# Patient Record
Sex: FEMALE | Race: AMERICAN INDIAN OR ALASKA NATIVE
[De-identification: names, ages, dates, MRNs, and addresses within clinical notes are randomized per-mention and may not be internally consistent; named-entity substitution may affect disease eponyms.]

---

## 2017-08-01 ENCOUNTER — HOSPITAL ENCOUNTER (OUTPATIENT)
Dept: HOSPITAL 5 - LABHHL | Age: 52
Discharge: HOME | End: 2017-08-01
Attending: SURGERY
Payer: COMMERCIAL

## 2017-08-01 DIAGNOSIS — C50.912: Primary | ICD-10-CM

## 2017-08-01 DIAGNOSIS — I10: ICD-10-CM

## 2017-08-01 PROCEDURE — 88305 TISSUE EXAM BY PATHOLOGIST: CPT

## 2017-08-10 ENCOUNTER — HOSPITAL ENCOUNTER (OUTPATIENT)
Dept: HOSPITAL 5 - SPVWC | Age: 52
Discharge: HOME | End: 2017-08-10
Attending: SURGERY
Payer: COMMERCIAL

## 2017-08-10 DIAGNOSIS — C50.212: Primary | ICD-10-CM

## 2017-08-10 PROCEDURE — 88305 TISSUE EXAM BY PATHOLOGIST: CPT

## 2017-08-10 PROCEDURE — 88342 IMHCHEM/IMCYTCHM 1ST ANTB: CPT

## 2017-08-10 PROCEDURE — 38525 BIOPSY/REMOVAL LYMPH NODES: CPT

## 2017-08-10 PROCEDURE — A4648 IMPLANTABLE TISSUE MARKER: HCPCS

## 2017-08-10 NOTE — ULTRASOUND REPORT
ULTRASOUND GUIDED NEEDLE CORE BIOPSY OF A LEFT AXILLARY LYMPH NODE WITH 

CLIP PLACEMENT :  08/10/17 10:06:00 



   

CLINICAL: Newly diagnosed left breast cancer.



COMPARISON :07/31/17 

   

FINDINGS: The procedure was explained to the patient and informed 

consent was obtained.

   

Ultrasound demonstrated a left axillary lymph node with cortical 

thickening a 4.7 mm..





The skin in the axilla was prepped with Betadine and anesthetized with 

1% lidocaine.  Ultrasound guided needle core biopsy of the  lymph node 

was performed through a small dermatotomy using 2% lidocaine with 

epinephrine for deep anesthesia and a 18-gauge Achieve biopsy device.  

Two samples were obtained and placed in formalin.  A clip was deployed 

within the lymph node.

   

Hemostasis was achieved with minimal pressure and a sterile dressing 

was applied.  The patient tolerated the procedure well and there were 

no apparent complications. She was discharged in good   

condition and was given instructions for wound care and followup.  

   

IMPRESSION: Uncomplicated ultrasound-guided needle core biopsy of a 

left lymph node with clip placement.

## 2017-08-28 ENCOUNTER — HOSPITAL ENCOUNTER (OUTPATIENT)
Dept: HOSPITAL 5 - SPVIMAG | Age: 52
Discharge: HOME | End: 2017-08-28
Attending: SURGERY
Payer: COMMERCIAL

## 2017-08-28 DIAGNOSIS — I10: ICD-10-CM

## 2017-08-28 DIAGNOSIS — C50.212: Primary | ICD-10-CM

## 2017-08-28 PROCEDURE — 0159T: CPT

## 2017-08-28 PROCEDURE — A9577 INJ MULTIHANCE: HCPCS

## 2017-08-28 PROCEDURE — 77059: CPT

## 2017-08-28 PROCEDURE — C8908 MRI W/O FOL W/CONT, BREAST,: HCPCS

## 2017-08-29 NOTE — MAGNETIC RESONANCE REPORT
BILATERAL BREAST MRI WITHOUT AND WITH CONTRAST: 08/28/17 08:58:00



CLINICAL: Newly diagnosed left breast cancer.  Status post left 

ultrasound-guided needle biopsy on 07/31/17 with pathologic diagnosis 

of invasive ductal carcinoma, Bon grade 2.  She also had an US 

guided left axillary lymph node biopsy performed by me on 08/10/17 with 

negative pathology.



COMPARISON:05/25/17 and 07/12/17 [facility] mammograms.



TECHNIQUE: Axial 1.0-mm T1 without,  axial high resolution 2.0-mm T2 

and axial 1.0-mm dynamic Vibrant high-resolution postcontrast T1 fat 

saturation sequences on a 1.5 Becca magnet.  The examination was 

performed with an 8 channel dedicated [place] breast coil. Post 

processing with CAD and subtraction was performed on an [employer] 

workstation.  20 cc of Multihance was injected without incident for the 

contrast portion of the exam. Consent was obtained prior to the 

administration of the contrast.  



FINDINGS:



Right: Mild background parenchymal enhancement.  No mass or suspicious 

enhancement.  No suspicious right axillary or right internal mammary 

lymph nodes.



Left: Mild background parenchymal enhancement.  The newly diagnosed 

left breast cancer is an irregular enhancing mass at 10 o'clock 9.5 cm 

from the nipple and 5.7 cm from the chest wall.  It measures 3.2 x 3.1 

x 2.0 cm and demonstrates heterogeneous enhancement with 170% peak 

enhancement, mixed kinetics and 19% type III washout.  No other mass or 

suspicious enhancement of the left breast.  The previously biopsied 

left axillary lymph node has benign morphology and is not enlarged.  

However, there is a larger more suspicious level I axillary lymph node 

measuring 2.4 x 1.6 cm.  The cortex is irregular and thickened (7 mm).  

Several smaller lymph nodes are less suspicious.





IMPRESSION: 1. 3.2 cm known left breast cancer and no additional 

suspicious lesion of the left breast.  2.  A 2.4 cm

level I axillary lymph is more suspicious than the previously biopsied 

left axillary lymph node.  3.  Negative right breast.





LEFT BI-RADS  6 -- Known Cancer





RIGHT BI-RADS 1 -- Negative

## 2017-08-31 ENCOUNTER — HOSPITAL ENCOUNTER (OUTPATIENT)
Dept: HOSPITAL 5 - SPVWC | Age: 52
Discharge: HOME | End: 2017-08-31
Attending: SURGERY
Payer: COMMERCIAL

## 2017-08-31 DIAGNOSIS — C50.212: Primary | ICD-10-CM

## 2017-08-31 DIAGNOSIS — R59.9: ICD-10-CM

## 2017-08-31 PROCEDURE — 38525 BIOPSY/REMOVAL LYMPH NODES: CPT

## 2017-08-31 PROCEDURE — 88305 TISSUE EXAM BY PATHOLOGIST: CPT

## 2017-08-31 PROCEDURE — A4648 IMPLANTABLE TISSUE MARKER: HCPCS

## 2017-08-31 NOTE — ULTRASOUND REPORT
ULTRASOUND GUIDED NEEDLE CORE BIOPSY OF A LEFT AXILLARY LYMPH NODE WITH 

CLIP PLACEMENT :  08/31/17 08:14:00 



   

CLINICAL: Newly diagnosed left breast cancer.  She had a negative 

ultrasound biopsy of a left axillary lymph node on 08/10/17 but the MRI 

demonstrated a more suspicious lymph node.



COMPARISON :08/28/17 MRI 

   

FINDINGS: The procedure was explained to the patient and informed 

consent was obtained.

   

Ultrasound demonstrated the 2 cm suspicious lymph node.





The skin in the axilla was prepped with Betadine and anesthetized with 

1% lidocaine.  Ultrasound guided needle core biopsy of the  lymph node 

was performed through a small dermatotomy using 2% lidocaine with 

epinephrine for deep anesthesia and a 18-gauge Achieve biopsy device.  

3 samples were obtained and placed in formalin.  A clip was deployed 

within the lymph node.

   

Hemostasis was achieved with minimal pressure and a sterile dressing 

was applied.  The patient tolerated the procedure well and there were 

no apparent complications. She was discharged in good condition and was 

given instructions for wound care and followup.  

   

IMPRESSION: Uncomplicated ultrasound-guided needle core biopsy of a 

left axillary lymph node with clip placement.

## 2017-09-27 ENCOUNTER — HOSPITAL ENCOUNTER (OUTPATIENT)
Dept: HOSPITAL 5 - OR | Age: 52
Discharge: HOME | End: 2017-09-27
Attending: SURGERY
Payer: COMMERCIAL

## 2017-09-27 VITALS — DIASTOLIC BLOOD PRESSURE: 75 MMHG | SYSTOLIC BLOOD PRESSURE: 127 MMHG

## 2017-09-27 DIAGNOSIS — Z80.3: ICD-10-CM

## 2017-09-27 DIAGNOSIS — C50.212: Primary | ICD-10-CM

## 2017-09-27 DIAGNOSIS — E66.9: ICD-10-CM

## 2017-09-27 LAB
BASOPHILS NFR BLD AUTO: 0.2 % (ref 0–1.8)
EOSINOPHIL NFR BLD AUTO: 1.1 % (ref 0–4.3)
HCT VFR BLD CALC: 32.6 % (ref 30.3–42.9)
HGB BLD-MCNC: 11.1 GM/DL (ref 10.1–14.3)
MCH RBC QN AUTO: 28 PG (ref 28–32)
MCHC RBC AUTO-ENTMCNC: 34 % (ref 30–34)
MCV RBC AUTO: 82 FL (ref 79–97)
PLATELET # BLD: 330 K/MM3 (ref 140–440)
RBC # BLD AUTO: 3.96 M/MM3 (ref 3.65–5.03)
WBC # BLD AUTO: 9 K/MM3 (ref 4.5–11)

## 2017-09-27 PROCEDURE — 78800 RP LOCLZJ TUM 1 AREA 1 D IMG: CPT

## 2017-09-27 PROCEDURE — 85025 COMPLETE CBC W/AUTO DIFF WBC: CPT

## 2017-09-27 PROCEDURE — 88341 IMHCHEM/IMCYTCHM EA ADD ANTB: CPT

## 2017-09-27 PROCEDURE — 88361 TUMOR IMMUNOHISTOCHEM/COMPUT: CPT

## 2017-09-27 PROCEDURE — 81025 URINE PREGNANCY TEST: CPT

## 2017-09-27 PROCEDURE — 88307 TISSUE EXAM BY PATHOLOGIST: CPT

## 2017-09-27 PROCEDURE — 19302 P-MASTECTOMY W/LN REMOVAL: CPT

## 2017-09-27 PROCEDURE — 88333 PATH CONSLTJ SURG CYTO XM 1: CPT

## 2017-09-27 PROCEDURE — 88342 IMHCHEM/IMCYTCHM 1ST ANTB: CPT

## 2017-09-27 PROCEDURE — 64450 NJX AA&/STRD OTHER PN/BRANCH: CPT

## 2017-09-27 PROCEDURE — 76098 X-RAY EXAM SURGICAL SPECIMEN: CPT

## 2017-09-27 PROCEDURE — 36415 COLL VENOUS BLD VENIPUNCTURE: CPT

## 2017-09-27 PROCEDURE — A9541 TC99M SULFUR COLLOID: HCPCS

## 2017-09-27 NOTE — MAMMOGRAPHY REPORT
Specimen radiograph.



Findings: A single specimen radiograph demonstrates multiple 

calcifications within the central aspect of the specimen. A single 

biopsy clip is present.

## 2017-09-27 NOTE — SHORT STAY SUMMARY
Short Stay Documentation


Date of service: 09/27/17





- History


H&P: obtained from office





- Allergies and Medications


Current Medications: 


 Allergies





No Known Allergies Allergy (Verified 06/28/14 22:57)


 





 Home Medications











 Medication  Instructions  Recorded  Confirmed  Last Taken  Type


 


Ferrous Sulfate [Feosol] 325 mg PO QDAY 09/13/17 09/27/17 09/27/17 History


 


RX: Triamter/Hctz 37.5-25 mg 1 tab PO DAILY 09/13/17 09/27/17 09/27/17 History





[Maxzide-25]     


 


HYDROcodone/APAP 5-325 [[place] 1 each PO Q6HR PRN #30 tablet 09/27/17  Unknown Rx





5/325]     








Active Medications





Cefazolin Sodium (Ancef/Sterile Water 2 Gm/20 Ml)  2 gm IV PREOP NR


   Stop: 09/27/17 23:59


Hydromorphone HCl (Dilaudid)  0.5 mg IV Q10MIN PRN


   PRN Reason: Pain , Severe (7-10)


   Stop: 09/27/17 23:59


Sodium Chloride (Nacl 0.9% 1000 Ml)  1,000 mls @ 75 mls/hr IV AS DIRECT VANESSA


   Last Admin: 09/27/17 11:20 Dose:  75 mls/hr


Midazolam HCl (Versed)  2 mg IV PREOP NR


   Stop: 09/27/17 23:59


   Last Admin: 09/27/17 11:58 Dose:  2 mg











- Brief post op/procedure progress note


Date of procedure: 09/27/17


Pre-op diagnosis: Left breast cancer upper inner quadrant


Post-op diagnosis: same


Procedure: 





Ultrasound guided left partial mastectomy with SLNB


Anesthesia: GETA


Findings: 





Known left breast cancer of the upper inner quadrant; radiograph specimen of 

partial mastecomy with clip present; 4 SLNs identified


Surgeon: RAYMUNDO VALIENTE


Assistant: TIERA ASHLEY


Estimated blood loss: minimal


Pathology: list (left partial mastectomy; 4SLNs)


Specimen disposition: to lab


Condition: stable





- Disposition


Condition at discharge: Good


Disposition: DC-01 TO HOME OR SELFCARE





Short Stay Discharge Plan


Activity: other (no heavy lifting)


Diet: regular


Wound: other (keep incisions clean and dry; may shower in 24 hours; no baths, 

pools or lakes)


Follow up with: 


LORY HUNTER MD [Primary Care Provider] - 7 Days


RAYMUNDO VALIENTE MD [Staff Physician] - 7 Days


Prescriptions: 


HYDROcodone/APAP 5-325 [[place] 5/325] 1 each PO Q6HR PRN #30 tablet


 PRN Reason: Pain

## 2017-09-27 NOTE — OPERATIVE REPORT
Operative Report


Operative Report: 





Date of Service: September 27, 2017





Preoperative diagnosis: Left breast cancer of the upper inner quadrant





Postoperative diagnosis: Same





Procedure: Ultrasound guided left partial mastectomy with SLNB





Surgeon: Glenys Daley MD





Assistant: Dr. Godfrey





Anesthesia: General





Findings: Radial breast specimen with mass and clip present, 4 sentinel lymph 

nodes





Complications: None





EBL: Minimal





Disposition: PACU in good condition





Indications for operative procedure: This is a 52-year-old premenopausal 

-American lady with newly diagnosed left breast cancer of the upper inner 

quadrant, stage II.  Recommendations were to proceed with a left partial 

mastectomy and sentinel lymph node biopsy.  Patient wished to proceed with 

above procedure.








Procedure in detail: The patient was taken to the operating room and was laid 

supine.  Anesthesia placed a pectoral muscle block prior to going back to the 

operating room.  Gen. anesthesia was administered.  The left breast and axilla 

were prepped and draped in the normal sterile operative fashion.  The nipple 

was injected with radioisotope.  Gamma probe was inserted into the axilla for 

identification of sentinel lymph node location. Skin incision was made in the 

axilla with a 15 blade knife and dissection taken down to the subcutaneous 

tissues.  Axillary fascia was opened with the Bovie cautery.  The gamma probe 

was inserted into the axilla and 4 sentinel lymph nodes were identified which 

were dissected free.  SLNS were sent for  x-ray with findings of one clip 

present.  Patient with prior lymph node biopsy with negative findings.  

Hemostasis was noted.  Axillary cavity was irrigated and suctioned.  Axillary 

fascia was approximated and closed using interrupted 3-0 Vicryl and skin 

brought together and closed using 4-0 Monocryl followed by skin affix.





Attention was then taken towards the left breast.  Known left breast cancer was 

at the upper inner quadrant at the 11 o'clock position 4-5 cm from the nipple.  

Ultrasound was used to any the area of incision of known breast cancer mass. A 

skin incision was then made with a 15 blade knife and dissection taken down to 

the subcutaneous tissues.  First began with raising of the superior flap taken 

down posteriorly to the pectoralis muscle, followed by raising of  the medial 

flap and taken down posterior to the pectoralis muscle, followed by raising of 

the inferior flap and  taken down to the pectoralis muscle, followed by raising 

of the lateral flap and taken down to the pectoralis muscle.  Partial 

mastectomy was removed from the pectoralis muscle without incident.  Specimen 

was appropriately marked and sent to radiology with findings of mass and clip 

present.  Specimen was sent to pathology.  Hemostasis was obtained.  The 

posterior tissue of the breast was mobilized for oncoplastic closure.  The deep 

breast tissues were appropriately approximated and closed using interrupted 3-0 

Vicryl and anterior tissues approximated and closed using interrupted 3-0 

Vicryl.  The skin brought together and closed using 4-0 Monocryl followed by 

skin affix.  She tolerated it very well and was awake from  anesthesia without 

any complications and transported to PACU in good condition.

## 2017-09-27 NOTE — MAMMOGRAPHY REPORT
Specimen radiograph.



Findings: 3 very small specimens are submitted. The radiographs of 

these 3 specimens demonstrates one biopsy clip within one of the 

specimens. No additional clips or calcifications are seen.

## 2017-09-27 NOTE — ANESTHESIA CONSULTATION
Anesthesia Consult and Med Hx


Date of service: 09/27/17





- Airway


Anesthetic Teeth Evaluation: Good, Chipped (top front), Partials


ROM Head & Neck: Adequate


Mental/Hyoid Distance: Adequate


Mallampati Class: Class II


Intubation Access Assessment: Probably Good





- Pulmonary Exam


CTA: Yes





- Cardiac Exam


Cardiac Exam: RRR





- Pre-Operative Health Status


ASA Pre-Surgery Classification: ASA2


Proposed Anesthetic Plan: General


Nerve Block: pec





- Pulmonary


Hx Smoking: No


Hx Sleep Apnea: No (SYED PRE SCREEN HIGH RISK)





- Cardiovascular System


Hx Hypertension: Yes





- Central Nervous System


Hx Seizures: No


CVA: No


Hx Back Pain: Yes





- Endocrine


Hx Renal Disease: No


Hx Cirrhosis: No


Hx Non-Insulin Dependent Diabetes: No





- Hematic


Hx Anemia: Yes





- Other Systems


Hx Obesity: Yes

## 2019-01-10 ENCOUNTER — HOSPITAL ENCOUNTER (INPATIENT)
Dept: HOSPITAL 5 - ED | Age: 54
LOS: 3 days | Discharge: HOME | DRG: 291 | End: 2019-01-13
Attending: INTERNAL MEDICINE | Admitting: INTERNAL MEDICINE
Payer: COMMERCIAL

## 2019-01-10 DIAGNOSIS — I11.0: Primary | ICD-10-CM

## 2019-01-10 DIAGNOSIS — E66.9: ICD-10-CM

## 2019-01-10 DIAGNOSIS — Z92.21: ICD-10-CM

## 2019-01-10 DIAGNOSIS — C50.912: ICD-10-CM

## 2019-01-10 DIAGNOSIS — Z82.49: ICD-10-CM

## 2019-01-10 DIAGNOSIS — I50.21: ICD-10-CM

## 2019-01-10 DIAGNOSIS — I42.0: ICD-10-CM

## 2019-01-10 DIAGNOSIS — J96.01: ICD-10-CM

## 2019-01-10 DIAGNOSIS — Z92.3: ICD-10-CM

## 2019-01-10 LAB
BASOPHILS # (AUTO): 0 K/MM3 (ref 0–0.1)
BASOPHILS NFR BLD AUTO: 0.5 % (ref 0–1.8)
BUN SERPL-MCNC: 18 MG/DL (ref 7–17)
BUN/CREAT SERPL: 20 %
CALCIUM SERPL-MCNC: 9.2 MG/DL (ref 8.4–10.2)
EOSINOPHIL # BLD AUTO: 0.2 K/MM3 (ref 0–0.4)
EOSINOPHIL NFR BLD AUTO: 2.1 % (ref 0–4.3)
HCT VFR BLD CALC: 36.9 % (ref 30.3–42.9)
HEMOLYSIS INDEX: 12
HGB BLD-MCNC: 12.3 GM/DL (ref 10.1–14.3)
LYMPHOCYTES # BLD AUTO: 2.2 K/MM3 (ref 1.2–5.4)
LYMPHOCYTES NFR BLD AUTO: 27.3 % (ref 13.4–35)
MCHC RBC AUTO-ENTMCNC: 33 % (ref 30–34)
MCV RBC AUTO: 88 FL (ref 79–97)
MONOCYTES # (AUTO): 0.6 K/MM3 (ref 0–0.8)
MONOCYTES % (AUTO): 7.6 % (ref 0–7.3)
PLATELET # BLD: 229 K/MM3 (ref 140–440)
RBC # BLD AUTO: 4.2 M/MM3 (ref 3.65–5.03)
T4 FREE SERPL-MCNC: 1.4 NG/DL (ref 0.76–1.46)

## 2019-01-10 PROCEDURE — 82728 ASSAY OF FERRITIN: CPT

## 2019-01-10 PROCEDURE — 93306 TTE W/DOPPLER COMPLETE: CPT

## 2019-01-10 PROCEDURE — 80048 BASIC METABOLIC PNL TOTAL CA: CPT

## 2019-01-10 PROCEDURE — 93005 ELECTROCARDIOGRAM TRACING: CPT

## 2019-01-10 PROCEDURE — 84439 ASSAY OF FREE THYROXINE: CPT

## 2019-01-10 PROCEDURE — 36415 COLL VENOUS BLD VENIPUNCTURE: CPT

## 2019-01-10 PROCEDURE — 71275 CT ANGIOGRAPHY CHEST: CPT

## 2019-01-10 PROCEDURE — 80053 COMPREHEN METABOLIC PANEL: CPT

## 2019-01-10 PROCEDURE — 84443 ASSAY THYROID STIM HORMONE: CPT

## 2019-01-10 PROCEDURE — 85025 COMPLETE CBC W/AUTO DIFF WBC: CPT

## 2019-01-10 PROCEDURE — 87806 HIV AG W/HIV1&2 ANTB W/OPTIC: CPT

## 2019-01-10 PROCEDURE — 85379 FIBRIN DEGRADATION QUANT: CPT

## 2019-01-10 PROCEDURE — 93017 CV STRESS TEST TRACING ONLY: CPT

## 2019-01-10 PROCEDURE — 83880 ASSAY OF NATRIURETIC PEPTIDE: CPT

## 2019-01-10 PROCEDURE — 78452 HT MUSCLE IMAGE SPECT MULT: CPT

## 2019-01-10 PROCEDURE — 84484 ASSAY OF TROPONIN QUANT: CPT

## 2019-01-10 PROCEDURE — A9502 TC99M TETROFOSMIN: HCPCS

## 2019-01-10 PROCEDURE — 93010 ELECTROCARDIOGRAM REPORT: CPT

## 2019-01-10 PROCEDURE — 94760 N-INVAS EAR/PLS OXIMETRY 1: CPT

## 2019-01-10 RX ADMIN — Medication SCH: at 22:36

## 2019-01-10 NOTE — CAT SCAN REPORT
CTA CHEST:



HISTORY: Dyspnea, tachycardia, cough.



COMPARISON: And.



TECHNIQUE: Helical CT in 1.25mm intervals following IV contrast.  

Pulmonary embolus protocol.  Sagittal and coronal reformatted images.  

Rotational MIP images.



FINDINGS:





Contrast bolus is satisfactory.  No pulmonary embolus is identified.



Thyroid gland: Normal.



Tracheobronchial tree: Normal.



Esophagus: Normal.



Heart: Mild cardiomegaly.



Pericardium: Trace pericardial fluid.



Mediastinum: Normal.



Lung Fields: Mild congestive changes are suspected in both lungs. No 

evidence for mass or infiltrate. No significant underlying lung disease 

is appreciated.



Pleural Spaces: Small layering pleural effusions are identified, right 

greater than left.



Musculoskeletal: Normal.





IMPRESSION: 

No evidence for pulmonary embolus.  

CHF.

## 2019-01-10 NOTE — HISTORY AND PHYSICAL REPORT
History of Present Illness


Chief complaint: 





I just cant breathe


History of present illness: 


52 YO Female with HTN, Obesity, BrCa presents to ED for evaluation. Pt states 

that she has experienced shortness of breath and nonproductive cough over the 

past 1 month with worsening symptoms over the past 1 week. Pt acknowledges 

Orthopnea/PND, Decreased Exercise tolerance, Shortness of Breath. Pt was seen 

and evaluated at Urgent Care and instructed to come to [facility] ED for further care 

and evaluation. Pt seen and evaluated in ED and found to have Acute CHF 

Decompensation complicated by Respiratory Failure. Pt initiated on CHF protocol,

and admitted to telemetry. Cardiology consulted in ED.  








Past History


Past Medical History: cancer, hypertension


Past Surgical History: Other (Lumpectomy, Breast Biopsy)


Social history: , lives with family


Family history: hypertension





Medications and Allergies


                                    Allergies











Allergy/AdvReac Type Severity Reaction Status Date / Time


 


No Known Allergies Allergy   Verified 10/31/17 12:20











                                Home Medications











 Medication  Instructions  Recorded  Confirmed  Last Taken  Type


 


Triamter/Hctz 37.5-25 mg 1 tab PO DAILY 09/13/17 01/10/19 11/01/17 History





[Maxzide-25]     


 


Anastrozole [Arimidex] 1 mg PO QPM 01/10/19 01/10/19 Unknown History











Active Meds: 


Active Medications





Acetaminophen (Tylenol)  650 mg PO Q4H PRN


   PRN Reason: Pain MILD(1-3)/Fever >100.5/HA


Acetaminophen/Hydrocodone Bitart ([place] 5/325)  1 each PO Q6HR PRN


   PRN Reason: Pain


Albuterol (Proventil)  2.5 mg IH Q4HRT PRN


   PRN Reason: Shortness Of Breath


Ferrous Sulfate (Feosol)  325 mg PO QDAY VANESSA


Furosemide (Lasix)  20 mg IV BID@0600,1800 VANESSA


Ondansetron HCl (Zofran)  4 mg IV Q8H PRN


   PRN Reason: Nausea And Vomiting


Sodium Chloride (Sodium Chloride Flush Syringe 10 Ml)  10 ml IV BID VANESSA


Sodium Chloride (Sodium Chloride Flush Syringe 10 Ml)  10 ml IV PRN PRN


   PRN Reason: LINE FLUSH











Review of Systems


Constitutional: no weight loss, no weight gain, no fever, no chills


Ears, nose, mouth and throat: no ear pain, no ear discharge, no tinnitis, no 

decreased hearing, no nose pain, no nasal congestion


Breasts: no change in shape, no swelling, no mass


Cardiovascular: orthopnea, dyspnea on exertion, paroxysmal nocturnal dyspnea, 

decreased exercise tolerance, no chest pain, no palpitations, no rapid/irregular

 heart beat, no edema


Respiratory: cough, no cough with sputum, no excessive sputum


Gastrointestinal: no abdominal pain, no nausea, no vomiting, no diarrhea


Genitourinary Female: no pelvic pain, no flank pain, no menorrhagia, no dysuria,

 no urinary frequency, no urgency


Rectal: no pain, no incontinence, no bleeding


Musculoskeletal: no neck stiffness, no neck pain, no shooting arm pain, no arm 

numbness/tingling, no low back pain, no shooting leg pain


Integumentary: no rash, no pruritis, no redness, no sores, no wounds


Neurological: no head injury, no paralysis, no weakness, no parathesias, no 

numbness


Psychiatric: no anxiety, no memory loss, no change in sleep habits, no sleep 

disturbances, no insomnia


Endocrine: no cold intolerance, no heat intolerance, no polyphagia, no excessive

 thirst


Hematologic/Lymphatic: no easy bruising, no easy bleeding, no lymphadenopathy, 

no lymphedema


Allergic/Immunologic: no urticaria, no allergic rhinitis, no wheezing, no 

persistent infections, no anaphylaxis, no angioedema





Exam





- Constitutional


Vitals: 


                                        











Temp Pulse Resp BP Pulse Ox


 


 98.1 F   103 H  22   137/99   98 


 


 01/10/19 10:09  01/10/19 10:09  01/10/19 10:09  01/10/19 10:09  01/10/19 10:09











General appearance: Present: mild distress, obese





- EENT


Eyes: Present: PERRL


ENT: hearing intact, clear oral mucosa





- Neck


Neck: Present: supple, normal ROM





- Respiratory


Respiratory effort: normal


Respiratory: bilateral: diminished, rhonchi





- Cardiovascular


Heart Sounds: Present: S1 & S2.  Absent: rub, click





- Extremities


Extremities: pulses symmetrical, No edema


Peripheral Pulses: within normal limits





- Abdominal


General gastrointestinal: Present: soft, non-tender, non-distended, normal bowel

 sounds


Female genitourinary: Present: normal





- Integumentary


Integumentary: Present: clear, warm, dry





- Musculoskeletal


Musculoskeletal: gait normal, strength equal bilaterally





- Psychiatric


Psychiatric: appropriate mood/affect, intact judgment & insight





- Neurologic


Neurologic: CNII-XII intact, moves all extremities





Results





- Labs


CBC & Chem 7: 


                                 01/10/19 11:22





                                 01/10/19 11:22


Labs: 


                              Abnormal lab results











  01/10/19 01/10/19 Range/Units





  11:22 11:22 


 


Mono % (Auto)  7.6 H   (0.0-7.3)  %


 


BUN   18 H  (7-17)  mg/dL


 


Glucose   121 H  ()  mg/dL


 


NT-Pro-B Natriuret Pep   8586 H  (0-900)  pg/mL














Assessment and Plan





- Patient Problems


(1) Acute CHF


Current Visit: Yes   Status: Acute   


Qualifiers: 


   Heart failure type: systolic   Qualified Code(s): I50.21 - Acute systolic 

(congestive) heart failure   


Plan to address problem: 


Admit to telemetry, Echo, cardiology consulted in ED, bnp, d dimer, afterload 

reduction, strict I/O, daily weight, monitor uop q shift, 








(2) Respiratory failure


Current Visit: Yes   Status: Acute   


Qualifiers: 


   Chronicity: acute   Respiratory failure complication: hypoxia   Qualified 

Code(s): J96.01 - Acute respiratory failure with hypoxia   


Plan to address problem: 


Supplemental oxygen, nebulizer therapy, Chest x ray, d dimer, pulse oximetry, 

CTA chest








(3) Breast cancer


Current Visit: No   Status: Acute   


Qualifiers: 


   Laterality: left 


Plan to address problem: 


supportive care, outpatient oncology F/U.








(4) DVT prophylaxis


Current Visit: Yes   Status: Acute   


Plan to address problem: 


SCD to BLE while in bed

## 2019-01-10 NOTE — EMERGENCY DEPARTMENT REPORT
ED Shortness of Breath HPI





- General


Chief Complaint: Dyspnea/Respdistress


Stated Complaint: SOB


Time Seen by Provider: 01/10/19 10:50


Source: patient


Mode of arrival: Ambulatory


Limitations: No Limitations





- History of Present Illness


Initial Comments: 





Mrs. Palacios is a very pleasant 53-year-old female has had 5 weeks of shortness 

of breath with nonproductive cough.  Dry cough.  She was seen at urgent care on 

Tuesday and was told that she had fluid on her lungs.  Subsequently she was 

evaluated by PMD who evaluated her and recommended ED evaluation.  She denies 

fever.  She denies chest pain.  Breast cancer is in remission.  Further medical 

history includes tension.  Peritonsillar symptoms.  No previous history of lung 

disease such as asthma.  However she did have a "attack" several years ago.  

She's noticed that the shortness of breath is worse at night.


MD Complaint: shortness of breath


-: week(s) (5)


Severity: mild, moderate


Consistency: intermittent


Context: recent URI


Associated Symptoms: cough





- Related Data


                                Home Medications











 Medication  Instructions  Recorded  Confirmed  Last Taken


 


Ferrous Sulfate [Feosol 325 MG tab] 325 mg PO QDAY 09/13/17 11/01/17 11/01/17


 


Triamter/Hctz 37.5-25 mg 1 tab PO DAILY 09/13/17 11/01/17 11/01/17





[Maxzide-25]    








                                  Previous Rx's











 Medication  Instructions  Recorded  Last Taken  Type


 


HYDROcodone/APAP 5-325 [[place] 1 each PO Q6HR PRN #30 tablet 09/27/17 Unknown Rx





5-325 mg TAB]    











                                    Allergies











Allergy/AdvReac Type Severity Reaction Status Date / Time


 


No Known Allergies Allergy   Verified 10/31/17 12:20














ED Review of Systems


ROS: 


Stated complaint: SOB


Other details as noted in HPI





Comment: All other systems reviewed and negative


Constitutional: denies: fever, malaise


Respiratory: cough, shortness of breath, wheezing


Cardiovascular: denies: chest pain





ED Past Medical Hx





- Past Medical History


Previous Medical History?: Yes


Hx Hypertension: Yes


Hx of Cancer: Yes (breast)





- Surgical History


Hx Breast Surgery: Yes (BREAST BX)


Additional Surgical History: lumpectomy





- Social History


Smoking Status: Never Smoker


Substance Use Type: None





- Medications


Home Medications: 


                                Home Medications











 Medication  Instructions  Recorded  Confirmed  Last Taken  Type


 


Ferrous Sulfate [Feosol 325 MG tab] 325 mg PO QDAY 09/13/17 11/01/17 11/01/17 

History


 


Triamter/Hctz 37.5-25 mg 1 tab PO DAILY 09/13/17 11/01/17 11/01/17 History





[Maxzide-25]     


 


HYDROcodone/APAP 5-325 [[place] 1 each PO Q6HR PRN #30 tablet 09/27/17 10/31/17 Un

known Rx





5-325 mg TAB]     














ED Physical Exam





- General


Limitations: No Limitations


General appearance: alert, in no apparent distress





- Head


Head exam: Present: atraumatic, normocephalic





- Eye


Eye exam: Present: normal appearance





- ENT


ENT exam: Present: mucous membranes moist





- Neck


Neck exam: Present: normal inspection.  Absent: tenderness, meningismus





- Respiratory


Respiratory exam: Present: normal lung sounds bilaterally.  Absent: respiratory 

distress, wheezes, rales, rhonchi





- Cardiovascular


Cardiovascular Exam: Present: regular rate, normal rhythm, normal heart sounds. 

Absent: systolic murmur, diastolic murmur, rubs, gallop





- GI/Abdominal


GI/Abdominal exam: Present: soft, normal bowel sounds.  Absent: distended, ten

derness, guarding, rebound





- Extremities Exam


Extremities exam: Present: normal inspection





- Back Exam


Back exam: Present: normal inspection





- Neurological Exam


Neurological exam: Present: alert, oriented X3, normal gait





- Psychiatric


Psychiatric exam: Present: normal affect, normal mood





- Skin


Skin exam: Present: warm, dry, intact, normal color.  Absent: rash





ED Course


                                   Vital Signs











  01/10/19





  10:09


 


Temperature 98.1 F


 


Pulse Rate 103 H


 


Respiratory 22





Rate 


 


Blood Pressure 137/99


 


O2 Sat by Pulse 98





Oximetry 














ED Medical Decision Making





- Lab Data


Result diagrams: 


                                 01/10/19 11:22





                                 01/10/19 11:22








                         Laboratory Results - last 24 hr











  01/10/19 01/10/19





  11:22 11:22


 


WBC  8.1 


 


RBC  4.20 


 


Hgb  12.3 


 


Hct  36.9 


 


MCV  88 


 


MCH  29 


 


MCHC  33 


 


RDW  13.5 


 


Plt Count  229 


 


Lymph % (Auto)  27.3 


 


Mono % (Auto)  7.6 H 


 


Eos % (Auto)  2.1 


 


Baso % (Auto)  0.5 


 


Lymph #  2.2 


 


Mono #  0.6 


 


Eos #  0.2 


 


Baso #  0.0 


 


Seg Neutrophils %  62.5 


 


Seg Neutrophils #  5.1 


 


Sodium   141


 


Potassium   3.6


 


Chloride   102.9


 


Carbon Dioxide   25


 


Anion Gap   17


 


BUN   18 H


 


Creatinine   0.9


 


Estimated GFR   > 60


 


BUN/Creatinine Ratio   20


 


Glucose   121 H


 


Calcium   9.2


 


Troponin T   < 0.010


 


NT-Pro-B Natriuret Pep   8586 H














- EKG Data





01/10/19 11:09


EKG obtained at 1021


Sinus tachycardia rate 100 beats a minute normal axis prolonged QT interval 

diffuse T-wave inversions nonspecific T wave pattern no ST elevation no signs of

pericarditis





- Radiology Data


Radiology results: report reviewed





CT chest angiogram findings of congestive heart failure with cardiomegaly, lung 

congestion, bilateral pleural effusions and pericardial fluid.





No pulmonary embolism





- Medical Decision Making





New-onset congestive heart failure with bilateral pleural effusions, significant

orthopnea and PND.  Hospitalist Dr. Luong agreed to admit to telemetry


Critical care attestation.: 


If time is entered above; I have spent that time in minutes in the direct care 

of this critically ill patient, excluding procedure time.








ED Disposition


Clinical Impression: 


 New onset of congestive heart failure, Acute CHF, Dyspnea, History of 

hypertension, History of breast cancer





Disposition: DC-09 OP ADMIT IP TO THIS HOSP


Is pt being admited?: Yes


Does the pt Need Aspirin: No


Condition: Stable

## 2019-01-11 RX ADMIN — Medication SCH ML: at 21:16

## 2019-01-11 RX ADMIN — Medication SCH ML: at 10:34

## 2019-01-11 RX ADMIN — FUROSEMIDE SCH MG: 10 INJECTION, SOLUTION INTRAVENOUS at 21:16

## 2019-01-11 RX ADMIN — FERROUS SULFATE TAB 325 MG (65 MG ELEMENTAL FE) SCH MG: 325 (65 FE) TAB at 10:34

## 2019-01-11 RX ADMIN — FUROSEMIDE SCH MG: 10 INJECTION, SOLUTION INTRAVENOUS at 06:25

## 2019-01-11 RX ADMIN — CARVEDILOL SCH MG: 6.25 TABLET, FILM COATED ORAL at 21:16

## 2019-01-11 NOTE — PROGRESS NOTE
Assessment and Plan


 Patient Problems


(1) Acute CHF


Current Visit: Yes   Status: Acute   


Qualifiers: 


   Heart failure type: systolic   Qualified Code(s): I50.21 - Acute systolic 

(congestive) heart failure   


Plan to address problem: 


IV lasix ECHO


Daily weights


I/O daily








(2) Respiratory failure


Current Visit: Yes   Status: Acute   


Qualifiers: 


   Chronicity: acute   Respiratory failure complication: hypoxia   Qualified 

Code(s): J96.01 - Acute respiratory failure with hypoxia   


Plan to address problem: 


Nebulizer treatments


IV Lasix and after load reducing agents








(3) Breast cancer


Current Visit: No   Status: Acute   


Qualifiers: 


   Laterality: left 


Plan to address problem: 


supportive care, outpatient oncology F/U.








(4) DVT prophylaxis


Current Visit: Yes   Status: Acute   


Plan to address problem: 


SCD to BLE while in bed











Subjective


Date of service: 01/11/19


Principal diagnosis: Acute CHF


Interval history: 


SOB better








Objective





- Constitutional


Vitals: 


                               Vital Signs - 12hr











  01/11/19 01/11/19





  07:40 10:00


 


Temperature 98.0 F 


 


Pulse Rate 86 


 


Pulse Rate [  86





Apical]  


 


Pulse Rate [  86





Left Radial]  


 


Pulse Rate [  86





Right Radial]  


 


Respiratory 20 18





Rate  


 


Blood Pressure 109/77 


 


O2 Sat by Pulse 99 98





Oximetry  











General appearance: Present: no acute distress, well-nourished





- EENT


Eyes: PERRL, EOM intact


ENT: hearing intact, clear oral mucosa


Ears: bilateral: normal





- Neck


Neck: supple, normal ROM





- Respiratory


Respiratory effort: normal


Respiratory: bilateral: CTA





- Breasts


Breasts: normal





- Cardiovascular


Rhythm: regular


Heart Sounds: Present: S1 & S2.  Absent: gallop, rub


Extremities: pulses intact, No edema, normal color, Full ROM





- Gastrointestinal


General gastrointestinal: Present: soft, non-tender, non-distended, normal bowel

sounds





- Genitourinary


Female genitourinary: normal





- Integumentary


Integumentary: clear, warm, dry





- Musculoskeletal


Musculoskeletal: 1, strength equal bilaterally





- Neurologic


Neurologic: moves all extremities





- Psychiatric


Psychiatric: memory intact, appropriate mood/affect, intact judgment & insight





- Labs


CBC & Chem 7: 


                                 01/10/19 11:22





                                 01/10/19 11:22 Measure In Units Or Cc's?: units

## 2019-01-11 NOTE — CONSULTATION
Addendum entered and electronically signed by BAMBI BUSH MD  01/11/19

11:27: 





Acute systolic heart failure


Dilated cardiomyopathy, etiology unknown but likely chemo induced


   LVEF 10-15%, restrictive physiology


History of breast cancer s/p chemo therapy in 2017. Patient not sure if she 

received herceptin or an anthracyclin





Recommendations:





Coreg 3.125 mg po bid


Lisinopril 2.5 mg po daily


Aldactone 12.5 mg po daily


Lasix 20 mg po bid (upon discharge)


Lexiscan in am


Blood for TSH, HIV and ferritin level








Original Note:








History of Present Illness


Consult date: 01/11/19


Consult reason: congestive heart failure


History of present illness: 





Patient is a 53 year old woman with a history of hypertension and left breast 

cancer status post chemotherapy and radiation in 2017. Patient denies prior 

cardiac history and has not had any prior cardiac workup. She presented with 

shortness of breath intermittent over several weeks, admitted with CHF. Patient 

denies chest pain and palpitations. There is no lower extremity edema. A chest 

x-ray is not available for review. A CT scan of the chest reports evidence of 

interstitial edema with small bilateral pleural effusion, right greater than the

left. No evidence of pulmonary embolism. Further workup with an echocardiogram 

done today reveals a severely decreased left ventricular systolic function, 

ejection fraction 10-15%. Cardiac consultation was requested.





Past History


Past Medical History: cancer, hypertension


Past Surgical History: Other (Lumpectomy, Breast Biopsy)


Social history: , lives with family


Family history: hypertension





Medications and Allergies


                                    Allergies











Allergy/AdvReac Type Severity Reaction Status Date / Time


 


No Known Allergies Allergy   Verified 10/31/17 12:20











                                Home Medications











 Medication  Instructions  Recorded  Confirmed  Last Taken  Type


 


Triamter/Hctz 37.5-25 mg 1 tab PO DAILY 09/13/17 01/10/19 11/01/17 History





[Maxzide-25]     


 


Anastrozole [Arimidex] 1 mg PO QPM 01/10/19 01/10/19 Unknown History











Active Meds: 


Active Medications





Acetaminophen (Tylenol)  650 mg PO Q4H PRN


   PRN Reason: Pain MILD(1-3)/Fever >100.5/HA


Acetaminophen/Hydrocodone Bitart (Norco 5/325)  1 each PO Q6HR PRN


   PRN Reason: Pain


Albuterol (Proventil)  2.5 mg IH Q4HRT PRN


   PRN Reason: Shortness Of Breath


Ferrous Sulfate (Feosol)  325 mg PO QDAY [facility]


   Last Admin: 01/11/19 10:34 Dose:  325 mg


   Documented by: 


Furosemide (Lasix)  20 mg IV BID@0600,1800 [facility]


   Last Admin: 01/11/19 06:25 Dose:  20 mg


   Documented by: 


Ondansetron HCl (Zofran)  4 mg IV Q8H PRN


   PRN Reason: Nausea And Vomiting


Sodium Chloride (Sodium Chloride Flush Syringe 10 Ml)  10 ml IV BID [facility]


   Last Admin: 01/11/19 10:34 Dose:  10 ml


   Documented by: 


Sodium Chloride (Sodium Chloride Flush Syringe 10 Ml)  10 ml IV PRN PRN


   PRN Reason: LINE FLUSH











Physical Examination


                                   Vital Signs











Temp Pulse Resp BP Pulse Ox


 


 98.1 F   103 H  22   137/99   98 


 


 01/10/19 10:09  01/10/19 10:09  01/10/19 10:09  01/10/19 10:09  01/10/19 10:09











General appearance: no acute distress


HEENT: Positive: PERRL


Cardiac: Positive: Reg Rate and Rhythm


Lungs: Positive: Decreased Breath Sounds


Neuro: Positive: Grossly Intact


Extremities: Absent: edema





Results





                                 01/10/19 11:22





                                 01/10/19 11:22


                                       CBC











  01/10/19 Range/Units





  11:22 


 


WBC  8.1  (4.5-11.0)  K/mm3


 


RBC  4.20  (3.65-5.03)  M/mm3


 


Hgb  12.3  (10.1-14.3)  gm/dl


 


Hct  36.9  (30.3-42.9)  %


 


Plt Count  229  (140-440)  K/mm3


 


Lymph #  2.2  (1.2-5.4)  K/mm3


 


Mono #  0.6  (0.0-0.8)  K/mm3


 


Eos #  0.2  (0.0-0.4)  K/mm3


 


Baso #  0.0  (0.0-0.1)  K/mm3








                          Comprehensive Metabolic Panel











  01/10/19 Range/Units





  11:22 


 


Sodium  141  (137-145)  mmol/L


 


Potassium  3.6  (3.6-5.0)  mmol/L


 


Chloride  102.9  ()  mmol/L


 


Carbon Dioxide  25  (22-30)  mmol/L


 


BUN  18 H  (7-17)  mg/dL


 


Creatinine  0.9  (0.7-1.2)  mg/dL


 


Glucose  121 H  ()  mg/dL


 


Calcium  9.2  (8.4-10.2)  mg/dL














Assessment and Plan





Acute systolic heart failure -new onset


   EF 10-15% by echo


Hypertension


Hx of left breast cancer


   s/p chemo and radiation in 2017

## 2019-01-12 LAB
ALBUMIN SERPL-MCNC: 4 G/DL (ref 3.9–5)
ALT SERPL-CCNC: 116 UNITS/L (ref 7–56)
BASOPHILS # (AUTO): 0 K/MM3 (ref 0–0.1)
BASOPHILS NFR BLD AUTO: 0.4 % (ref 0–1.8)
BUN SERPL-MCNC: 21 MG/DL (ref 7–17)
BUN/CREAT SERPL: 21 %
CALCIUM SERPL-MCNC: 9 MG/DL (ref 8.4–10.2)
EOSINOPHIL # BLD AUTO: 0.3 K/MM3 (ref 0–0.4)
EOSINOPHIL NFR BLD AUTO: 3.6 % (ref 0–4.3)
HCT VFR BLD CALC: 38 % (ref 30.3–42.9)
HEMOLYSIS INDEX: 6
HGB BLD-MCNC: 12.6 GM/DL (ref 10.1–14.3)
LYMPHOCYTES # BLD AUTO: 3.3 K/MM3 (ref 1.2–5.4)
LYMPHOCYTES NFR BLD AUTO: 40.6 % (ref 13.4–35)
MCHC RBC AUTO-ENTMCNC: 33 % (ref 30–34)
MCV RBC AUTO: 90 FL (ref 79–97)
MONOCYTES # (AUTO): 0.7 K/MM3 (ref 0–0.8)
MONOCYTES % (AUTO): 9 % (ref 0–7.3)
PLATELET # BLD: 239 K/MM3 (ref 140–440)
RBC # BLD AUTO: 4.25 M/MM3 (ref 3.65–5.03)

## 2019-01-12 RX ADMIN — ASPIRIN SCH MG: 81 TABLET, COATED ORAL at 10:49

## 2019-01-12 RX ADMIN — LISINOPRIL SCH MG: 5 TABLET ORAL at 10:50

## 2019-01-12 RX ADMIN — CARVEDILOL SCH MG: 6.25 TABLET, FILM COATED ORAL at 10:48

## 2019-01-12 RX ADMIN — SPIRONOLACTONE SCH MG: 25 TABLET ORAL at 10:49

## 2019-01-12 RX ADMIN — CARVEDILOL SCH: 6.25 TABLET, FILM COATED ORAL at 21:31

## 2019-01-12 RX ADMIN — FUROSEMIDE SCH MG: 10 INJECTION, SOLUTION INTRAVENOUS at 07:05

## 2019-01-12 RX ADMIN — FERROUS SULFATE TAB 325 MG (65 MG ELEMENTAL FE) SCH MG: 325 (65 FE) TAB at 10:49

## 2019-01-12 RX ADMIN — FUROSEMIDE SCH MG: 10 INJECTION, SOLUTION INTRAVENOUS at 20:00

## 2019-01-12 RX ADMIN — Medication SCH ML: at 10:51

## 2019-01-12 RX ADMIN — Medication SCH ML: at 21:30

## 2019-01-12 NOTE — PROGRESS NOTE
Assessment and Plan





Acute systolic heart failure and newly diagnosed non ischemic cardiomyopathy.


   No significant ischemia based on MPI


   EF 10-15% by echo


Hypertension


Hx of left breast cancer


   s/p chemo and radiation in 2017





Recommend:


Continue current therapy including diuresis and medical therapy for NICMP








Subjective


Date of service: 01/12/19


Principal diagnosis: Acute CHF


Interval history: 





Continues to have intermittent dyspnea.  Underwent MPI today





Objective


                                   Vital Signs











  Temp Pulse Resp BP Pulse Ox


 


 01/12/19 10:50   89   120/81 


 


 01/12/19 10:49   89   120/81 


 


 01/12/19 10:48   89   120/81 


 


 01/12/19 09:18   88   117/81 


 


 01/12/19 09:17   90   121/83 


 


 01/12/19 09:16   91 H   141/87 


 


 01/12/19 09:15   89   127/87 


 


 01/12/19 09:14   86   122/80 


 


 01/12/19 08:01   83   124/83 


 


 01/12/19 07:25  98.0 F  89  20  115/81  99


 


 01/12/19 04:02  97.6 F  86  18  103/73  97


 


 01/11/19 22:59  97.7 F  90  14  106/67  96


 


 01/11/19 21:16   95 H   118/81 


 


 01/11/19 19:35  97.7 F  98 H  12  118/81  94


 


 01/11/19 19:00   99 H   


 


 01/11/19 15:59  98.3 F  92 H  20  106/72  99














- Physical Examination


HEENT: Positive: PERRL


Cardiac: Positive: Reg Rate and Rhythm


Lungs: Positive: Decreased Breath Sounds


Neuro: Positive: Grossly Intact


Extremities: Absent: edema





- Labs and Meds


                                 Cardiac Enzymes











  01/12/19 Range/Units





  04:17 


 


AST  61 H  (5-40)  units/L








                                       CBC











  01/12/19 Range/Units





  04:17 


 


WBC  8.2  (4.5-11.0)  K/mm3


 


RBC  4.25  (3.65-5.03)  M/mm3


 


Hgb  12.6  (10.1-14.3)  gm/dl


 


Hct  38.0  (30.3-42.9)  %


 


Plt Count  239  (140-440)  K/mm3


 


Lymph #  3.3  (1.2-5.4)  K/mm3


 


Mono #  0.7  (0.0-0.8)  K/mm3


 


Eos #  0.3  (0.0-0.4)  K/mm3


 


Baso #  0.0  (0.0-0.1)  K/mm3








                          Comprehensive Metabolic Panel











  01/12/19 Range/Units





  04:17 


 


Sodium  144  (137-145)  mmol/L


 


Potassium  3.2 L  (3.6-5.0)  mmol/L


 


Chloride  101.9  ()  mmol/L


 


Carbon Dioxide  27  (22-30)  mmol/L


 


BUN  21 H  (7-17)  mg/dL


 


Creatinine  1.0  (0.7-1.2)  mg/dL


 


Glucose  97  ()  mg/dL


 


Calcium  9.0  (8.4-10.2)  mg/dL


 


AST  61 H  (5-40)  units/L


 


ALT  116 H  (7-56)  units/L


 


Alkaline Phosphatase  115  ()  units/L


 


Total Protein  7.5  (6.3-8.2)  g/dL


 


Albumin  4.0  (3.9-5)  g/dL

## 2019-01-12 NOTE — PROGRESS NOTE
Assessment and Plan


 Patient Problems


(1) Acute CHF


Current Visit: Yes   Status: Acute   


Qualifiers: 


   Heart failure type: systolic   Qualified Code(s): I50.21 - Acute systolic 

(congestive) heart failure   


Plan to address problem: 


IV lasix ECHO


Daily weights


I/O daily


ECHO EF 15 percent


Chemo induced???








(2) Respiratory failure


Current Visit: Yes   Status: Acute   


Qualifiers: 


   Chronicity: acute   Respiratory failure complication: hypoxia   Qualified 

Code(s): J96.01 - Acute respiratory failure with hypoxia   


Plan to address problem: 


Nebulizer treatments


IV Lasix and after load reducing agents








(3) Breast cancer


Current Visit: No   Status: Acute   


Qualifiers: 


   Laterality: left 


Plan to address problem: 


supportive care, outpatient oncology F/U.








(4) DVT prophylaxis


Current Visit: Yes   Status: Acute   


Plan to address problem: 


SCD to BLE while in bed











Subjective


Date of service: 01/12/19


Principal diagnosis: Acute CHF


Interval history: 


SOB better








Objective





- Constitutional


Vitals: 


                               Vital Signs - 12hr











  01/12/19 01/12/19 01/12/19





  04:02 07:25 08:01


 


Temperature 97.6 F 98.0 F 


 


Pulse Rate 86 89 83


 


Pulse Rate [   





Apical]   


 


Pulse Rate [   





Left Radial]   


 


Pulse Rate [   





Right Radial]   


 


Respiratory 18 20 





Rate   


 


Blood Pressure 103/73 115/81 124/83


 


Blood Pressure   





[Left]   


 


O2 Sat by Pulse 97 99 





Oximetry   














  01/12/19 01/12/19 01/12/19





  09:14 09:15 09:16


 


Temperature   


 


Pulse Rate 86 89 91 H


 


Pulse Rate [   





Apical]   


 


Pulse Rate [   





Left Radial]   


 


Pulse Rate [   





Right Radial]   


 


Respiratory   





Rate   


 


Blood Pressure 122/80 127/87 141/87


 


Blood Pressure   





[Left]   


 


O2 Sat by Pulse   





Oximetry   














  01/12/19 01/12/19 01/12/19





  09:17 09:18 10:00


 


Temperature   


 


Pulse Rate 90 88 


 


Pulse Rate [   89





Apical]   


 


Pulse Rate [   89





Left Radial]   


 


Pulse Rate [   89





Right Radial]   


 


Respiratory   19





Rate   


 


Blood Pressure 121/83 117/81 


 


Blood Pressure   





[Left]   


 


O2 Sat by Pulse   99





Oximetry   














  01/12/19 01/12/19 01/12/19





  10:48 10:49 10:50


 


Temperature   


 


Pulse Rate 89 89 89


 


Pulse Rate [   





Apical]   


 


Pulse Rate [   





Left Radial]   


 


Pulse Rate [   





Right Radial]   


 


Respiratory   





Rate   


 


Blood Pressure 120/81 120/81 120/81


 


Blood Pressure   





[Left]   


 


O2 Sat by Pulse   





Oximetry   














  01/12/19





  11:27


 


Temperature 98.0 F


 


Pulse Rate 84


 


Pulse Rate [ 





Apical] 


 


Pulse Rate [ 





Left Radial] 


 


Pulse Rate [ 





Right Radial] 


 


Respiratory 20





Rate 


 


Blood Pressure 


 


Blood Pressure 115/82





[Left] 


 


O2 Sat by Pulse 100





Oximetry 











General appearance: Present: no acute distress, well-nourished





- EENT


Eyes: PERRL, EOM intact


ENT: hearing intact, clear oral mucosa


Ears: bilateral: normal





- Neck


Neck: supple, normal ROM





- Respiratory


Respiratory effort: normal


Respiratory: bilateral: CTA, rales





- Breasts


Breasts: normal





- Cardiovascular


Heart rate: 78


Rhythm: regular


Heart Sounds: Present: S1 & S2.  Absent: gallop, rub


Extremities: pulses intact, No edema, normal color, Full ROM





- Gastrointestinal


General gastrointestinal: Present: soft, non-tender, non-distended, normal bowel

 sounds





- Genitourinary


Female genitourinary: normal





- Integumentary


Integumentary: clear, warm, dry





- Musculoskeletal


Musculoskeletal: 1, strength equal bilaterally





- Neurologic


Neurologic: moves all extremities





- Psychiatric


Psychiatric: memory intact, appropriate mood/affect, intact judgment & insight





- Allied health notes


Allied health notes reviewed: nursing, case management





- Labs


CBC & Chem 7: 


                                 01/12/19 04:17





                                 01/12/19 04:17


Labs: 


                              Abnormal lab results











  01/12/19 01/12/19 Range/Units





  04:17 04:17 


 


Lymph % (Auto)  40.6 H   (13.4-35.0)  %


 


Mono % (Auto)  9.0 H   (0.0-7.3)  %


 


Potassium   3.2 L  (3.6-5.0)  mmol/L


 


BUN   21 H  (7-17)  mg/dL


 


AST   61 H  (5-40)  units/L


 


ALT   116 H  (7-56)  units/L








ECHO 





15 percent EF


Systolic failure











- Imaging and cardiology


EKG: report reviewed

## 2019-01-13 VITALS — SYSTOLIC BLOOD PRESSURE: 98 MMHG | DIASTOLIC BLOOD PRESSURE: 72 MMHG

## 2019-01-13 RX ADMIN — Medication SCH ML: at 09:34

## 2019-01-13 RX ADMIN — LISINOPRIL SCH: 5 TABLET ORAL at 09:32

## 2019-01-13 RX ADMIN — SPIRONOLACTONE SCH: 25 TABLET ORAL at 09:31

## 2019-01-13 RX ADMIN — CARVEDILOL SCH: 6.25 TABLET, FILM COATED ORAL at 09:32

## 2019-01-13 RX ADMIN — FUROSEMIDE SCH MG: 10 INJECTION, SOLUTION INTRAVENOUS at 06:35

## 2019-01-13 RX ADMIN — ASPIRIN SCH MG: 81 TABLET, COATED ORAL at 09:34

## 2019-01-13 RX ADMIN — FERROUS SULFATE TAB 325 MG (65 MG ELEMENTAL FE) SCH MG: 325 (65 FE) TAB at 09:34

## 2019-01-13 NOTE — DISCHARGE SUMMARY
Providers





- Providers


Date of Admission: 


01/10/19 14:59





Attending physician: 


CATHERINE MC





                                        





01/10/19 15:04


Consult to Cardiology [CONS] Routine 


   Consulting Provider: STELLA CLARK


   Reason For Exam: chf











Primary care physician: 


PRIMARY CARE MD








Hospitalization


Condition: Stable


Hospital course: 





Assessment and Plan





Acute systolic heart failure and newly diagnosed non ischemic cardiomyopathy.


   No significant ischemia based on MPI


   EF 10-15% by echo


Hypertension


Hx of left breast cancer


   s/p chemo and radiation in 2017





Recommend:


Continue current therapy including diuresis and medical therapy for NICMP





Disposition: DC-01 TO HOME OR SELFCARE





Core Measure Documentation





- Palliative Care


Palliative Care/ Comfort Measures: Not Applicable





- Core Measures


Any of the following diagnoses?: none





Exam





- Constitutional


Vitals: 


                                        











Temp Pulse Resp BP Pulse Ox


 


 98.0 F   88   18   98/72   98 


 


 01/13/19 07:56  01/13/19 10:00  01/13/19 10:00  01/13/19 09:32  01/13/19 10:00











General appearance: Present: no acute distress, well-nourished





- EENT


Eyes: Present: PERRL


ENT: hearing intact, clear oral mucosa





- Neck


Neck: Present: supple, normal ROM





- Respiratory


Respiratory effort: normal


Respiratory: bilateral: CTA





- Cardiovascular


Heart rate: 78


Rhythm: regular


Heart Sounds: Present: S1 & S2.  Absent: rub, click





- Extremities


Extremities: no ischemia, pulses intact, pulses symmetrical, No edema


Peripheral Pulses: within normal limits





- Abdominal


General gastrointestinal: Present: soft, non-tender, non-distended, normal bowel

 sounds


Female genitourinary: Present: normal





- Rectal


Rectal Exam: deferred





- Integumentary


Integumentary: Present: clear, warm, dry





- Musculoskeletal


Musculoskeletal: gait normal, strength equal bilaterally





- Psychiatric


Psychiatric: appropriate mood/affect, intact judgment & insight





- Neurologic


Neurologic: CNII-XII intact, moves all extremities





- Allied Health


Allied health notes reviewed: nursing, case management





Plan


Activity: no restrictions


Diet: low fat, low cholesterol, low salt


Follow up with: 


PRIMARY CARE,MD [Primary Care Provider] - 7 Days

## 2019-04-10 ENCOUNTER — HOSPITAL ENCOUNTER (OUTPATIENT)
Dept: HOSPITAL 5 - OR | Age: 54
Discharge: HOME | End: 2019-04-10
Attending: SURGERY
Payer: COMMERCIAL

## 2019-04-10 VITALS — SYSTOLIC BLOOD PRESSURE: 117 MMHG | DIASTOLIC BLOOD PRESSURE: 83 MMHG

## 2019-04-10 DIAGNOSIS — Z79.82: ICD-10-CM

## 2019-04-10 DIAGNOSIS — Z98.890: ICD-10-CM

## 2019-04-10 DIAGNOSIS — Z86.79: ICD-10-CM

## 2019-04-10 DIAGNOSIS — D64.9: ICD-10-CM

## 2019-04-10 DIAGNOSIS — Z45.2: Primary | ICD-10-CM

## 2019-04-10 DIAGNOSIS — Z79.899: ICD-10-CM

## 2019-04-10 DIAGNOSIS — Z85.3: ICD-10-CM

## 2019-04-10 DIAGNOSIS — Z90.12: ICD-10-CM

## 2019-04-10 DIAGNOSIS — I11.0: ICD-10-CM

## 2019-04-10 DIAGNOSIS — I50.9: ICD-10-CM

## 2019-04-10 DIAGNOSIS — E78.00: ICD-10-CM

## 2019-04-10 NOTE — SHORT STAY SUMMARY
Short Stay Documentation


Date of service: 04/10/19





- History


H&P: obtained from office





- Allergies and Medications


Current Medications: 


                                    Allergies





No Known Allergies Allergy (Verified 04/04/19 14:30)


   





                                Home Medications











 Medication  Instructions  Recorded  Confirmed  Last Taken  Type


 


Anastrozole [Arimidex] 1 mg PO QPM 01/10/19 01/10/19 Unknown History


 


Aspirin EC [Aspirin Enteric Coated 81 mg PO QDAY #100 tablet 01/13/19  Unknown R

x





TAB]     


 


Carvedilol [Coreg] 6.25 mg PO BID #30 tablet 01/13/19  Unknown Rx


 


Ferrous Sulfate [Feosol 325 MG tab] 325 mg PO QDAY #30 tablet 01/13/19  Unknown 

Rx


 


Furosemide [Lasix] 40 mg PO QDAY #40 tablet 01/13/19  Unknown Rx


 


Lisinopril [Zestril TAB] 5 mg PO QDAY #30 tablet 01/13/19  Unknown Rx


 


Potassium Chloride [K-Dur] 20 meq PO QDAY #30 tablet 01/13/19  Unknown Rx














- Brief post op/procedure progress note


Date of procedure: 04/10/19


Pre-op diagnosis: Personal history of 


Post-op diagnosis: same


Procedure: 





Port Removal


Anesthesia: local


Findings: 





Port removed in its entirety


Surgeon: RAYMUNDO VALIENTE


Estimated blood loss: minimal


Pathology: none


Specimen disposition: discarded


Condition: stable





- Disposition


Condition at discharge: Good


Disposition: DC-01 TO HOME OR SELFCARE





Short Stay Discharge Plan


Activity: other (no heavy lifting)


Diet: regular


Wound: keep clean and dry (may shower in 48 hours, no baths)


Follow up with: 


BRY STALEY MD [Primary Care Provider] - 7 Days


RAYMUNDO VALIENTE MD [Staff Physician] - 7 Days

## 2019-04-10 NOTE — OPERATIVE REPORT
Operative Report


Operative Report: 





Date of Service: April 10, 2019





Properative diagnosis: Right port with central venous access not indicated





Postoperative diagnosis: Same





Procedure: Right port removal





Surgeon: Glenys Daley MD





Anesthesia: Local





Findings: Removal of right port in its entirety





Complications: None





Drain: None





Disposition: PACU in good condition





Indications for operative procedure: This is a 53-year-old lady with a personal 

history of left breast cancer, Stage II.  Patient has completed neoadjuvant 

chemotherapy and central venous access no longer indicated.  Patient wished to 

proceed with port removal.





Procedure in detail: Patient was taken to the operating procedure room.  She was

laid supine.  Local anesthesia was administered. The port was identified of the 

right chest.The right chest was prepped and draped in the normal sterile 

operative fashion.  The skin was anesthetized with 1% lidocaine mix with quarter

percent Marcaine.  The prior port incision was opened with a 15 blade knife and 

taken down to the subcutaneous tissues.  The port was encountered.  The catheter

was encountered and was appropriately dissected free and removed in its 

entirety.  Then proceed with  port removal with dissection of scar tissue around

the port.  The port was removed in its entirety without any complications.  H

emostasis was obtained.  The subcutanoeus tisses were brought together and 

closed with interrupted 3-0 Vicryl followed by closing of the skin with a 

running 4-0 Monocryl.  She tolerated the procedure very well and was transferred

to PACU in good condition.

## 2021-05-11 ENCOUNTER — HOSPITAL ENCOUNTER (OUTPATIENT)
Dept: HOSPITAL 5 - SPVWC | Age: 56
Discharge: HOME | End: 2021-05-11
Attending: SURGERY
Payer: COMMERCIAL

## 2021-05-11 DIAGNOSIS — Z12.31: Primary | ICD-10-CM

## 2021-05-11 DIAGNOSIS — N64.89: ICD-10-CM

## 2021-05-11 PROCEDURE — 77063 BREAST TOMOSYNTHESIS BI: CPT

## 2021-05-11 PROCEDURE — 77067 SCR MAMMO BI INCL CAD: CPT

## 2021-05-14 NOTE — MAMMOGRAPHY REPORT
DIGITAL SCREENING MAMMOGRAM WITH TOMOSYNTHESIS WITH CAD, 5/14/2021



CLINICAL INFORMATION / INDICATION: Screening



TECHNIQUE:  Digital bilateral 2D and 3D mammography with tomosynthesis was obtained in the craniocaud
al and mediolateral oblique projections.  Computer-Aided Detection (CAD) analysis was used for interp
retation of this study.



COMPARISON: 5/7/2020



FINDINGS: 



Breast Density: The breasts are heterogeneously dense, which may obscure small masses.



No dominant mass, suspicious calcifications, or architectural distortion in either breast. 



Pacer is seen on the left. Left surgical changes are again noted. Mild nodularity is stable.

 

IMPRESSION: No mammographic evidence of malignancy.



Follow up recommendation: Routine yearly



BI-RADS Category 2:  Benign.





-------------------------------------------------------------------------------------------

A "normal" or negative report should not discourage follow up or biopsy of a clinically significant f
inding.



A written summary of these findings will be mailed to the patient. The patient will be entered into a
 mammography reporting system which will generate a reminder letter for the patient's next appointmen
t at the appropriate interval.



The [employer] recommends yearly mammograms starting at age 40 and continuing as l
colby as a woman is in good health.  Breast MRI is recommended for women with an approximate 20-25% or 
greater lifetime risk of breast cancer, including women with a strong family history of breast or ova
radha cancer or who have been treated for Hodgkin's disease.



Signer Name: Gilberto Osorio MD 

Signed: 5/14/2021 8:16 AM

Workstation Name: [email or web address]

## 2021-05-14 NOTE — MAMMOGRAPHY REPORT
DIGITAL SCREENING MAMMOGRAM WITH TOMOSYNTHESIS WITH CAD, 5/14/2021



CLINICAL INFORMATION / INDICATION: Screening



TECHNIQUE:  Digital bilateral 2D and 3D mammography with tomosynthesis was obtained in the craniocaud
al and mediolateral oblique projections.  Computer-Aided Detection (CAD) analysis was used for interp
retation of this study.



COMPARISON: 5/7/2020



FINDINGS: 



Breast Density: The breasts are heterogeneously dense, which may obscure small masses.



No dominant mass, suspicious calcifications, or architectural distortion in either breast. 



Pacer is seen on the left. Left surgical changes are again noted. Mild nodularity is stable.

 

IMPRESSION: No mammographic evidence of malignancy.



Follow up recommendation: Routine yearly



BI-RADS Category 2:  Benign.





-------------------------------------------------------------------------------------------

A "normal" or negative report should not discourage follow up or biopsy of a clinically significant f
inding.



A written summary of these findings will be mailed to the patient. The patient will be entered into a
 mammography reporting system which will generate a reminder letter for the patient's next appointmen
t at the appropriate interval.



The [employer] recommends yearly mammograms starting at age 40 and continuing as l
colby as a woman is in good health.  Breast MRI is recommended for women with an approximate 20-25% or 
greater lifetime risk of breast cancer, including women with a strong family history of breast or ova
radha cancer or who have been treated for Hodgkin's disease.



Signer Name: Gilberto Osorio MD 

Signed: 5/14/2021 8:16 AM

Workstation Name: [email or web address]

## 2021-10-08 ENCOUNTER — HOSPITAL ENCOUNTER (OUTPATIENT)
Dept: HOSPITAL 5 - CT | Age: 56
Discharge: HOME | End: 2021-10-08
Attending: INTERNAL MEDICINE
Payer: COMMERCIAL

## 2021-10-08 DIAGNOSIS — Z79.811: ICD-10-CM

## 2021-10-08 DIAGNOSIS — R59.9: ICD-10-CM

## 2021-10-08 DIAGNOSIS — J98.11: ICD-10-CM

## 2021-10-08 DIAGNOSIS — M85.80: ICD-10-CM

## 2021-10-08 DIAGNOSIS — Z51.11: Primary | ICD-10-CM

## 2021-10-08 DIAGNOSIS — R35.0: ICD-10-CM

## 2021-10-08 DIAGNOSIS — R81: ICD-10-CM

## 2021-10-08 DIAGNOSIS — L08.9: ICD-10-CM

## 2021-10-08 DIAGNOSIS — I50.9: ICD-10-CM

## 2021-10-08 DIAGNOSIS — D50.0: ICD-10-CM

## 2021-10-08 DIAGNOSIS — R91.1: ICD-10-CM

## 2021-10-08 DIAGNOSIS — K76.0: ICD-10-CM

## 2021-10-08 DIAGNOSIS — R74.8: ICD-10-CM

## 2021-10-08 DIAGNOSIS — D70.9: ICD-10-CM

## 2021-10-08 DIAGNOSIS — R05.9: ICD-10-CM

## 2021-10-08 DIAGNOSIS — E87.6: ICD-10-CM

## 2021-10-08 DIAGNOSIS — I95.9: ICD-10-CM

## 2021-10-08 DIAGNOSIS — C50.212: ICD-10-CM

## 2021-10-08 DIAGNOSIS — D72.829: ICD-10-CM

## 2021-10-08 LAB — BUN SERPL-MCNC: 11 MG/DL (ref 7–17)

## 2021-10-08 PROCEDURE — 84520 ASSAY OF UREA NITROGEN: CPT

## 2021-10-08 PROCEDURE — 82565 ASSAY OF CREATININE: CPT

## 2021-10-08 PROCEDURE — 74177 CT ABD & PELVIS W/CONTRAST: CPT

## 2021-10-08 PROCEDURE — 36415 COLL VENOUS BLD VENIPUNCTURE: CPT

## 2021-10-08 PROCEDURE — 71260 CT THORAX DX C+: CPT

## 2021-10-08 NOTE — CAT SCAN REPORT
CT CHEST, ABDOMEN, AND PELVIS WITH IV CONTRAST



INDICATION / CLINICAL INFORMATION:

IRON DEFICIENCY ANEMIA OMNI 300 100ML .



TECHNIQUE:

Axial CT images were obtained through the chest, abdomen, and pelvis after IV contrast. All CT scans 
at this location are performed using CT dose reduction for ALARA by means of automated exposure contr
ol. 



COMPARISON:

1/10/2019



FINDINGS:

CHEST: Mild atelectasis with increased interstitial prominence within the left lower lung. Peripheral
 areas of nodularity identified with one nodule measuring 7 mm and the other 7 mm on axial image 71. 
There is bilateral hilar adenopathy. There is diffuse fatty infiltration of the liver. Extrahepatic b
iliary dilatation. No acute bone findings are definitely seen.



IMPRESSION:

1. Left lung volume is slightly decreased in size compared with the right. There is increased interst
itial prominence within the left lower lung with 2 prominent pulmonary nodules measuring proxy 7 mm a
djacent to the pleural surface best seen on axial image 71. These are not definitely seen on the prio
r examination.  In combination with mediastinal adenopathy findings concerning for malignancy.

2. Mediastinal and bilateral hilar adenopathy. Diffuse paratracheal and periaortic and superior media
stinal adenopathy. Findings concerning for malignancy.



Signer Name: Kal Melgoza MD 

Signed: 10/8/2021 1:09 PM

Workstation Name: [email or web address]